# Patient Record
Sex: FEMALE | Race: BLACK OR AFRICAN AMERICAN | NOT HISPANIC OR LATINO | Employment: FULL TIME | URBAN - METROPOLITAN AREA
[De-identification: names, ages, dates, MRNs, and addresses within clinical notes are randomized per-mention and may not be internally consistent; named-entity substitution may affect disease eponyms.]

---

## 2022-06-04 ENCOUNTER — APPOINTMENT (OUTPATIENT)
Dept: GENERAL RADIOLOGY | Facility: CLINIC | Age: 67
End: 2022-06-04
Attending: EMERGENCY MEDICINE
Payer: COMMERCIAL

## 2022-06-04 ENCOUNTER — HOSPITAL ENCOUNTER (EMERGENCY)
Facility: CLINIC | Age: 67
Discharge: HOME | End: 2022-06-04
Attending: EMERGENCY MEDICINE | Admitting: EMERGENCY MEDICINE
Payer: COMMERCIAL

## 2022-06-04 VITALS
RESPIRATION RATE: 20 BRPM | SYSTOLIC BLOOD PRESSURE: 169 MMHG | TEMPERATURE: 97.4 F | HEART RATE: 91 BPM | OXYGEN SATURATION: 99 % | DIASTOLIC BLOOD PRESSURE: 89 MMHG

## 2022-06-04 DIAGNOSIS — S82.822A CLOSED TORUS FRACTURE OF DISTAL END OF LEFT FIBULA, INITIAL ENCOUNTER: ICD-10-CM

## 2022-06-04 PROCEDURE — 27786 TREATMENT OF ANKLE FRACTURE: CPT | Mod: LT

## 2022-06-04 PROCEDURE — 250N000013 HC RX MED GY IP 250 OP 250 PS 637: Performed by: EMERGENCY MEDICINE

## 2022-06-04 PROCEDURE — 99284 EMERGENCY DEPT VISIT MOD MDM: CPT

## 2022-06-04 PROCEDURE — 73610 X-RAY EXAM OF ANKLE: CPT | Mod: LT

## 2022-06-04 RX ORDER — HYDROCODONE BITARTRATE AND ACETAMINOPHEN 5; 325 MG/1; MG/1
1 TABLET ORAL EVERY 6 HOURS PRN
Qty: 15 TABLET | Refills: 0 | Status: SHIPPED | OUTPATIENT
Start: 2022-06-04

## 2022-06-04 RX ORDER — IBUPROFEN 600 MG/1
600 TABLET, FILM COATED ORAL ONCE
Status: COMPLETED | OUTPATIENT
Start: 2022-06-04 | End: 2022-06-04

## 2022-06-04 RX ORDER — HYDROCODONE BITARTRATE AND ACETAMINOPHEN 5; 325 MG/1; MG/1
1 TABLET ORAL ONCE
Status: COMPLETED | OUTPATIENT
Start: 2022-06-04 | End: 2022-06-04

## 2022-06-04 RX ORDER — IBUPROFEN 600 MG/1
600 TABLET, FILM COATED ORAL EVERY 6 HOURS PRN
Qty: 30 TABLET | Refills: 1 | Status: SHIPPED | OUTPATIENT
Start: 2022-06-04

## 2022-06-04 RX ADMIN — HYDROCODONE BITARTRATE AND ACETAMINOPHEN 1 TABLET: 5; 325 TABLET ORAL at 12:14

## 2022-06-04 RX ADMIN — IBUPROFEN 600 MG: 600 TABLET ORAL at 12:14

## 2022-06-04 ASSESSMENT — ENCOUNTER SYMPTOMS
ARTHRALGIAS: 1
JOINT SWELLING: 1

## 2022-06-04 NOTE — DISCHARGE INSTRUCTIONS
Wear cam boot at all times unless in the shower do not bear weight on the left ankle if not stabilized with the boot.  Use ice and elevation use pain medication when needed.  Please follow-up orthopedics for recheck in a week or 2 for reassessment of distal fibula fracture.

## 2022-06-04 NOTE — ED PROVIDER NOTES
History   Chief Complaint:  Fall and Ankle Pain       HPI   Laila Osborne is a 67 year old female who presents with left ankle pain and swelling after tripping and falling on a cord earlier today, with no head trauma. The patient reports that she can still put some weight on her sprained leg, but reports to have painful experience while doing it. Denies use of pain reliever medication.       Review of Systems   Musculoskeletal: Positive for arthralgias and joint swelling.        + left ankle pain   All other systems reviewed and are negative.        Allergies:  The patient has no known allergies.     Medications:  Aspirin  lipitor  Lancets  prinivil  glucophage    Past Medical History:     Hypertension  Hyperlipidemia  Type 2 diabetes mellitus  Allergic rhintis    Past Surgical History:    Trigger finger release    Social History:  The patient present with her son.       Physical Exam     Patient Vitals for the past 24 hrs:   BP Temp Temp src Pulse Resp SpO2   06/04/22 1139 (!) 169/89 97.4  F (36.3  C) Temporal 91 20 99 %       Physical Exam  Cardiovascular:      Rate and Rhythm: Normal rate.   Pulmonary:      Effort: Pulmonary effort is normal.      Breath sounds: Normal breath sounds.   Musculoskeletal:      Comments: There is swelling and tenderness over the lateral malleolus.  There is no fifth metatarsal tenderness.  There is limited flexion extension due to pain normal capillary refill in all 5 digits.   Skin:     Capillary Refill: Capillary refill takes less than 2 seconds.   Neurological:      General: No focal deficit present.      Mental Status: She is alert and oriented to person, place, and time.         Emergency Department Course     Imaging:  XR Ankle Left G/E 3 Views   Final Result   IMPRESSION: Oblique fracture distal fibula extending into the lateral corner of the ankle mortise. No evidence for asymmetry of the ankle mortise. Surrounding soft tissue swelling about the lateral malleolus.            Report per radiology      Emergency Department Course:         Reviewed:  I reviewed nursing notes, vitals, past medical history and Care Everywhere    Assessments:  1154 I obtained history and examined the patient as noted above.   1220 I rechecked the patient and explained findings.     Interventions:  1214 Ibuprofen 600 mg oral   1214 Norco 1 tablet oral    Disposition:  The patient was discharged to home.     Impression & Plan     Medical Decision Making:  Patient presents with acute pain and injury to her left ankle x-rays confirm distal fibular fracture without signs of widening of the medial mortise or medial tibial injury.  Patient was reviewed the x-rays recommend cam boot crutches and follow-up with orthopedics for reassessment.  Pain medication was provided and was discharged in stable condition.    Diagnosis:    ICD-10-CM    1. Closed torus fracture of distal end of left fibula, initial encounter  S82.822A        Discharge Medications:  Discharge Medication List as of 6/4/2022 12:39 PM      START taking these medications    Details   HYDROcodone-acetaminophen (NORCO) 5-325 MG tablet Take 1 tablet by mouth every 6 hours as needed for pain, Disp-15 tablet, R-0, Local Print      ibuprofen (ADVIL/MOTRIN) 600 MG tablet Take 1 tablet (600 mg) by mouth every 6 hours as needed for moderate pain, Disp-30 tablet, R-1, Local Print             Scribe Disclosure:  I, CHRISTIAN TRINIDAD and Unique Leyva, am serving as a scribe at 11:54 AM on 6/4/2022 to document services personally performed by Jeremias Bhatia MD based on my observations and the provider's statements to me.        Jeremias Bhatia MD  06/05/22 9209

## 2022-06-04 NOTE — ED TRIAGE NOTES
Pt arrives with c/o left ankle pain after tripping and falling over an extension cord. Pt did not hit her head in the fall. Swelling noted to left ankle in triage.      Triage Assessment     Row Name 06/04/22 1140       Triage Assessment (Adult)    Airway WDL WDL       Respiratory WDL    Respiratory WDL WDL       Skin Circulation/Temperature WDL    Skin Circulation/Temperature WDL WDL       Cardiac WDL    Cardiac WDL WDL       Peripheral/Neurovascular WDL    Peripheral Neurovascular WDL WDL       Cognitive/Neuro/Behavioral WDL    Cognitive/Neuro/Behavioral WDL WDL

## 2022-06-04 NOTE — LETTER
June 4, 2022      To Whom It May Concern:      Laila Osborne was seen in our Emergency Department today, 06/04/22.  Out of work until follow up with orthopedic doctor.    Sincerely,        David Davies RN

## 2025-01-23 ENCOUNTER — APPOINTMENT (OUTPATIENT)
Dept: CT IMAGING | Facility: CLINIC | Age: 70
End: 2025-01-23
Attending: EMERGENCY MEDICINE
Payer: COMMERCIAL

## 2025-01-23 ENCOUNTER — HOSPITAL ENCOUNTER (EMERGENCY)
Facility: CLINIC | Age: 70
End: 2025-01-23
Attending: EMERGENCY MEDICINE
Payer: COMMERCIAL

## 2025-01-23 VITALS
SYSTOLIC BLOOD PRESSURE: 166 MMHG | DIASTOLIC BLOOD PRESSURE: 91 MMHG | HEIGHT: 61 IN | BODY MASS INDEX: 23.1 KG/M2 | HEART RATE: 82 BPM | OXYGEN SATURATION: 98 % | RESPIRATION RATE: 20 BRPM | WEIGHT: 122.36 LBS | TEMPERATURE: 96.9 F

## 2025-01-23 DIAGNOSIS — J01.30 ACUTE SPHENOIDAL SINUSITIS, RECURRENCE NOT SPECIFIED: ICD-10-CM

## 2025-01-23 DIAGNOSIS — E04.1 THYROID NODULE: ICD-10-CM

## 2025-01-23 DIAGNOSIS — R51.9 LEFT-SIDED HEADACHE: ICD-10-CM

## 2025-01-23 DIAGNOSIS — R51.9 NONINTRACTABLE HEADACHE, UNSPECIFIED CHRONICITY PATTERN, UNSPECIFIED HEADACHE TYPE: Primary | ICD-10-CM

## 2025-01-23 LAB
ANION GAP SERPL CALCULATED.3IONS-SCNC: 10 MMOL/L (ref 7–15)
BASOPHILS # BLD AUTO: 0.1 10E3/UL (ref 0–0.2)
BASOPHILS NFR BLD AUTO: 1 %
BUN SERPL-MCNC: 16.6 MG/DL (ref 8–23)
CALCIUM SERPL-MCNC: 9 MG/DL (ref 8.8–10.4)
CHLORIDE SERPL-SCNC: 103 MMOL/L (ref 98–107)
CREAT SERPL-MCNC: 0.61 MG/DL (ref 0.51–0.95)
EGFRCR SERPLBLD CKD-EPI 2021: >90 ML/MIN/1.73M2
EOSINOPHIL # BLD AUTO: 0.2 10E3/UL (ref 0–0.7)
EOSINOPHIL NFR BLD AUTO: 2 %
ERYTHROCYTE [DISTWIDTH] IN BLOOD BY AUTOMATED COUNT: 12.5 % (ref 10–15)
FLUAV RNA SPEC QL NAA+PROBE: NEGATIVE
FLUBV RNA RESP QL NAA+PROBE: NEGATIVE
GLUCOSE SERPL-MCNC: 159 MG/DL (ref 70–99)
HCO3 SERPL-SCNC: 25 MMOL/L (ref 22–29)
HCT VFR BLD AUTO: 36.6 % (ref 35–47)
HGB BLD-MCNC: 12.1 G/DL (ref 11.7–15.7)
IMM GRANULOCYTES # BLD: 0 10E3/UL
IMM GRANULOCYTES NFR BLD: 0 %
LYMPHOCYTES # BLD AUTO: 2.3 10E3/UL (ref 0.8–5.3)
LYMPHOCYTES NFR BLD AUTO: 23 %
MCH RBC QN AUTO: 29.2 PG (ref 26.5–33)
MCHC RBC AUTO-ENTMCNC: 33.1 G/DL (ref 31.5–36.5)
MCV RBC AUTO: 88 FL (ref 78–100)
MONOCYTES # BLD AUTO: 0.7 10E3/UL (ref 0–1.3)
MONOCYTES NFR BLD AUTO: 7 %
NEUTROPHILS # BLD AUTO: 6.5 10E3/UL (ref 1.6–8.3)
NEUTROPHILS NFR BLD AUTO: 67 %
NRBC # BLD AUTO: 0 10E3/UL
NRBC BLD AUTO-RTO: 0 /100
PLATELET # BLD AUTO: 384 10E3/UL (ref 150–450)
POTASSIUM SERPL-SCNC: 4.1 MMOL/L (ref 3.4–5.3)
RBC # BLD AUTO: 4.14 10E6/UL (ref 3.8–5.2)
RSV RNA SPEC NAA+PROBE: NEGATIVE
SARS-COV-2 RNA RESP QL NAA+PROBE: NEGATIVE
SODIUM SERPL-SCNC: 138 MMOL/L (ref 135–145)
WBC # BLD AUTO: 9.7 10E3/UL (ref 4–11)

## 2025-01-23 PROCEDURE — 250N000011 HC RX IP 250 OP 636: Performed by: EMERGENCY MEDICINE

## 2025-01-23 PROCEDURE — 250N000013 HC RX MED GY IP 250 OP 250 PS 637: Performed by: EMERGENCY MEDICINE

## 2025-01-23 PROCEDURE — 85041 AUTOMATED RBC COUNT: CPT | Performed by: EMERGENCY MEDICINE

## 2025-01-23 PROCEDURE — 99285 EMERGENCY DEPT VISIT HI MDM: CPT | Mod: 25

## 2025-01-23 PROCEDURE — 85004 AUTOMATED DIFF WBC COUNT: CPT | Performed by: EMERGENCY MEDICINE

## 2025-01-23 PROCEDURE — 82565 ASSAY OF CREATININE: CPT | Performed by: EMERGENCY MEDICINE

## 2025-01-23 PROCEDURE — 96374 THER/PROPH/DIAG INJ IV PUSH: CPT | Mod: 59

## 2025-01-23 PROCEDURE — 70450 CT HEAD/BRAIN W/O DYE: CPT

## 2025-01-23 PROCEDURE — 70496 CT ANGIOGRAPHY HEAD: CPT

## 2025-01-23 PROCEDURE — 96375 TX/PRO/DX INJ NEW DRUG ADDON: CPT

## 2025-01-23 PROCEDURE — 258N000003 HC RX IP 258 OP 636: Performed by: EMERGENCY MEDICINE

## 2025-01-23 PROCEDURE — 82435 ASSAY OF BLOOD CHLORIDE: CPT | Performed by: EMERGENCY MEDICINE

## 2025-01-23 PROCEDURE — 36415 COLL VENOUS BLD VENIPUNCTURE: CPT | Performed by: EMERGENCY MEDICINE

## 2025-01-23 PROCEDURE — 87637 SARSCOV2&INF A&B&RSV AMP PRB: CPT | Performed by: EMERGENCY MEDICINE

## 2025-01-23 PROCEDURE — 96361 HYDRATE IV INFUSION ADD-ON: CPT

## 2025-01-23 RX ORDER — ACETAMINOPHEN 325 MG/1
975 TABLET ORAL ONCE
Status: COMPLETED | OUTPATIENT
Start: 2025-01-23 | End: 2025-01-23

## 2025-01-23 RX ORDER — IOPAMIDOL 755 MG/ML
500 INJECTION, SOLUTION INTRAVASCULAR ONCE
Status: COMPLETED | OUTPATIENT
Start: 2025-01-23 | End: 2025-01-23

## 2025-01-23 RX ORDER — DIPHENHYDRAMINE HYDROCHLORIDE 50 MG/ML
25 INJECTION INTRAMUSCULAR; INTRAVENOUS ONCE
Status: COMPLETED | OUTPATIENT
Start: 2025-01-23 | End: 2025-01-23

## 2025-01-23 RX ADMIN — DIPHENHYDRAMINE HYDROCHLORIDE 25 MG: 50 INJECTION, SOLUTION INTRAMUSCULAR; INTRAVENOUS at 22:11

## 2025-01-23 RX ADMIN — IOPAMIDOL 67 ML: 755 INJECTION, SOLUTION INTRAVENOUS at 23:15

## 2025-01-23 RX ADMIN — ACETAMINOPHEN 975 MG: 325 TABLET, FILM COATED ORAL at 22:11

## 2025-01-23 RX ADMIN — SODIUM CHLORIDE, POTASSIUM CHLORIDE, SODIUM LACTATE AND CALCIUM CHLORIDE 1000 ML: 600; 310; 30; 20 INJECTION, SOLUTION INTRAVENOUS at 22:10

## 2025-01-23 RX ADMIN — PROCHLORPERAZINE EDISYLATE 5 MG: 5 INJECTION INTRAMUSCULAR; INTRAVENOUS at 22:11

## 2025-01-23 ASSESSMENT — COLUMBIA-SUICIDE SEVERITY RATING SCALE - C-SSRS
6. HAVE YOU EVER DONE ANYTHING, STARTED TO DO ANYTHING, OR PREPARED TO DO ANYTHING TO END YOUR LIFE?: NO
2. HAVE YOU ACTUALLY HAD ANY THOUGHTS OF KILLING YOURSELF IN THE PAST MONTH?: NO
1. IN THE PAST MONTH, HAVE YOU WISHED YOU WERE DEAD OR WISHED YOU COULD GO TO SLEEP AND NOT WAKE UP?: NO

## 2025-01-23 ASSESSMENT — ACTIVITIES OF DAILY LIVING (ADL)
ADLS_ACUITY_SCORE: 41
ADLS_ACUITY_SCORE: 41

## 2025-01-24 VITALS
OXYGEN SATURATION: 99 % | DIASTOLIC BLOOD PRESSURE: 87 MMHG | BODY MASS INDEX: 23.1 KG/M2 | SYSTOLIC BLOOD PRESSURE: 160 MMHG | HEART RATE: 80 BPM | WEIGHT: 122.36 LBS | HEIGHT: 61 IN | RESPIRATION RATE: 18 BRPM | TEMPERATURE: 96.9 F

## 2025-01-24 NOTE — DISCHARGE INSTRUCTIONS
Please follow-up with your primary care provider and/or specialist regarding your visit to the ER today.    Please return to the emergency department should you experience any of the symptoms we specifically discussed, including but not limited to recurrence or worsening of your symptoms, or development of any new and concerning symptoms such as fever, neck pain, eye pain, worsening headache, or new numbness/weakness.    You do have evidence of sinusitis, given the severity of your symptoms today, I have prescribed you with a wait and see antibiotic. If you continue to have symptoms for 10 days or greater or develop worsening pain/fever, you may fill your prescription.

## 2025-01-24 NOTE — ED TRIAGE NOTES
Pt arrives from home with her niece.  Pt was seen at  and sent here.  Pt has a 10/10 headache for 3 days pt has taken tylenol headache goes down a little bit but has not really changed for the 3 days.  Pt denies chest pain SOB fever or chills.  Pt denies dizziness or balance issues no change in vision just sensitivity to light and noise.      Triage Assessment (Adult)       Row Name 01/23/25 1955          Triage Assessment    Airway WDL WDL        Respiratory WDL    Respiratory WDL WDL        Skin Circulation/Temperature WDL    Skin Circulation/Temperature WDL WDL        Cardiac WDL    Cardiac WDL WDL        Peripheral/Neurovascular WDL    Peripheral Neurovascular WDL WDL        Cognitive/Neuro/Behavioral WDL    Cognitive/Neuro/Behavioral WDL WDL        Go Coma Scale    Best Eye Response 4-->(E4) spontaneous     Best Motor Response 6-->(M6) obeys commands     Best Verbal Response 5-->(V5) oriented     [place] Coma Scale Score 15

## 2025-01-24 NOTE — ED PROVIDER NOTES
"  Emergency Department Note      History of Present Illness     Chief Complaint   Headache      HPI   Laila Osborne is a 70 year old female with a history of hypertension, type II diabetes, and migraine who presents with her daughter to the Emergency Department for headache. The patient reports on 1/21/24, she was sitting down when she had a sudden onset of sharp left-sided headache that radiates to her left eye, ear, and neck and is worsened with palpation of the left temple. The patient endorses photosensitivity, taking aspirin, and history of migraine. She notes this is the worst headache she has had and see receives a few hours of relief after taking Tylenol. Denies neck stiffness, fever, chest pain, abdominal pain, dizziness, confusion, or changes in vision.    Independent Historian   None    Review of External Notes   I reviewed the patient's Urgent Care note from 1/23/24.    Past Medical History     Medical History and Problem List   Closed fracture of left distal fibula   Hypertension   Hypothyroidism   Hyperlipidemia   Migraine  Osteoporosis   Type II diabetes    Medications   Aspirin 81 MG  Alendronate   Atorvastatin   HYDROcodone-acetaminophen  lisinopriL   metFORMIN     Physical Exam     Patient Vitals for the past 24 hrs:   BP Temp Temp src Pulse Resp SpO2 Height Weight   01/23/25 1954 (!) 166/91 96.9  F (36.1  C) Temporal 82 20 98 % 1.549 m (5' 1\") 55.5 kg (122 lb 5.7 oz)     Physical Exam  Constitutional:       General: Not in acute distress.     Appearance: Normal appearance  HENT:      Head: Normocephalic and atraumatic.      Face: No facial asymmetry. No temporal artery tenderness to palpation.  Eyes:     Extraocular Movements: Extraocular movements intact.      Conjunctiva/sclera: Conjunctivae normal.      Pupils: Pupils are equal, round, and reactive to light.   Cardiovascular:     Rate and Rhythm: Normal rate and regular rhythm.   Pulmonary:      Effort: Pulmonary effort is normal.      Breath " "sounds: Normal breath sounds.   Abdominal:      General: Abdomen is flat. There is no distension.      Palpations: Abdomen is soft.      Tenderness: There is no abdominal tenderness.   Musculoskeletal:      Cervical back: Normal range of motion and neck supple. No rigidity.      General: No swelling or deformity.   Skin:     General: Skin is warm and dry.   Neurological:      General: No focal deficit present.     Mental Status: Alert and oriented to person, place, and time.   Psychiatric:         Mood and Affect: Mood normal.         Behavior: Behavior normal.     Diagnostics     Lab Results   Labs Ordered and Resulted from Time of ED Arrival to Time of ED Departure   BASIC METABOLIC PANEL - Abnormal       Result Value    Sodium 138      Potassium 4.1      Chloride 103      Carbon Dioxide (CO2) 25      Anion Gap 10      Urea Nitrogen 16.6      Creatinine 0.61      GFR Estimate >90      Calcium 9.0      Glucose 159 (*)    INFLUENZA A/B, RSV AND SARS-COV2 PCR - Normal    Influenza A PCR Negative      Influenza B PCR Negative      RSV PCR Negative      SARS CoV2 PCR Negative     CBC WITH PLATELETS AND DIFFERENTIAL    WBC Count 9.7      RBC Count 4.14      Hemoglobin 12.1      Hematocrit 36.6      MCV 88      MCH 29.2      MCHC 33.1      RDW 12.5      Platelet Count 384      % Neutrophils 67      % Lymphocytes 23      % Monocytes 7      % Eosinophils 2      % Basophils 1      % Immature Granulocytes 0      NRBCs per 100 WBC 0      Absolute Neutrophils 6.5      Absolute Lymphocytes 2.3      Absolute Monocytes 0.7      Absolute Eosinophils 0.2      Absolute Basophils 0.1      Absolute Immature Granulocytes 0.0      Absolute NRBCs 0.0         Imaging   CT Head w/o Contrast    (Results Pending)   CTA Head Neck w Contrast    (Results Pending)       EKG   None    Independent Interpretation   {IndependentReview:797230::\"None\"}    ED Course      Medications Administered   Medications   lactated ringers BOLUS 1,000 mL (1,000 " "mLs Intravenous $New Bag 1/23/25 2210)   prochlorperazine (COMPAZINE) injection 5 mg (5 mg Intravenous $Given 1/23/25 2211)   diphenhydrAMINE (BENADRYL) injection 25 mg (25 mg Intravenous $Given 1/23/25 2211)   acetaminophen (TYLENOL) tablet 975 mg (975 mg Oral $Given 1/23/25 2211)       Procedures   None     Discussion of Management   {Consults/Care Discussions:257330::\"None\"}    ED Course   ED Course as of 01/23/25 2304   Thu Jan 23, 2025 2152 I evaluated the patient, obtained history, and performed a physical exam as detailed above.    2220 I rechecked the patient and updated them on the plan of care.        Additional Documentation  None    Medical Decision Making / Diagnosis     CMS Diagnoses: None    MIPS       None    MDM   70-year-old female as described above presents to the emergency department for 10 out of 10 left-sided headache that been ongoing for the past 3 days that was sudden in onset.  Associated light sensitivity, but no neck pain or neck stiffness.  No other complaints at this time.  Very distant history of migraines, but this is worst headache ever.  Differential diagnosis considered includes, but not limited to, migraine headache, cluster headache, tension headache, ICH, SAH, neck vessel dissection, and cerebral aneurysm.  Will trial headache cocktail.  CT head and CTA head and neck for evaluation of above discussed differentials.  No reproducible temporal artery tenderness to palpation to suggest giant cell arteritis at this time.  Will reevaluate after headache cocktail.  Discussed care plan with patient who voiced understanding and agreement with plan.  Answered all questions.  Additional workup and orders as listed in chart.     Ultimately, work up shows ***.     Patient was ***     Please refer to ED course above as part of continuation of MDM for details on the patient's treatment course and any potential changes or updates beyond my initial evaluation and MDM creation.    Disposition "   {[facility] Dispo:269175}    Diagnosis   No diagnosis found.     Discharge Medications   New Prescriptions    No medications on file     Scribe Disclosure:  I, Daniela Hanley, am serving as a scribe at 10:06 PM on 1/23/2025 to document services personally performed by Manuel Randolph DO based on my observations and the provider's statements to me.      as part of continuation of MDM for details on the patient's treatment course and any potential changes or updates beyond my initial evaluation and MDM creation.    Disposition   The patient was discharged.     Diagnosis     ICD-10-CM    1. Nonintractable headache, unspecified chronicity pattern, unspecified headache type  R51.9 ED To Primary Care Referral      2. Left-sided headache  R51.9       3. Acute sphenoidal sinusitis, recurrence not specified  J01.30       4. Thyroid nodule  E04.1 ED To Primary Care Referral           Discharge Medications   Discharge Medication List as of 1/24/2025 12:25 AM        START taking these medications    Details   amoxicillin-clavulanate (AUGMENTIN) 875-125 MG tablet Take 1 tablet by mouth 2 times daily for 5 days., Disp-10 tablet, R-0, E-Prescribe           Scribe Disclosure:  I, Daniela Hanley, am serving as a scribe at 10:06 PM on 1/23/2025 to document services personally performed by Manuel Randolph DO based on my observations and the provider's statements to me.        Manuel Randolph DO  01/24/25 0038       Manuel Randolph DO  01/24/25 0040